# Patient Record
Sex: FEMALE | Race: WHITE | NOT HISPANIC OR LATINO | Employment: FULL TIME | URBAN - METROPOLITAN AREA
[De-identification: names, ages, dates, MRNs, and addresses within clinical notes are randomized per-mention and may not be internally consistent; named-entity substitution may affect disease eponyms.]

---

## 2024-08-11 ENCOUNTER — HOSPITAL ENCOUNTER (EMERGENCY)
Facility: HOSPITAL | Age: 39
Discharge: OTHER INSTITUTION | End: 2024-08-11
Attending: EMERGENCY MEDICINE
Payer: MEDICAID

## 2024-08-11 ENCOUNTER — APPOINTMENT (OUTPATIENT)
Dept: RADIOLOGY | Facility: HOSPITAL | Age: 39
End: 2024-08-11
Payer: MEDICAID

## 2024-08-11 VITALS
BODY MASS INDEX: 22.48 KG/M2 | OXYGEN SATURATION: 100 % | WEIGHT: 134.92 LBS | SYSTOLIC BLOOD PRESSURE: 133 MMHG | DIASTOLIC BLOOD PRESSURE: 74 MMHG | TEMPERATURE: 97.5 F | HEART RATE: 70 BPM | RESPIRATION RATE: 18 BRPM | HEIGHT: 65 IN

## 2024-08-11 DIAGNOSIS — S61.209A DEGLOVING INJURY OF FINGER, INITIAL ENCOUNTER: Primary | ICD-10-CM

## 2024-08-11 PROCEDURE — 99284 EMERGENCY DEPT VISIT MOD MDM: CPT | Mod: 25

## 2024-08-11 PROCEDURE — 96365 THER/PROPH/DIAG IV INF INIT: CPT

## 2024-08-11 PROCEDURE — 2500000004 HC RX 250 GENERAL PHARMACY W/ HCPCS (ALT 636 FOR OP/ED): Mod: JZ | Performed by: EMERGENCY MEDICINE

## 2024-08-11 PROCEDURE — 73140 X-RAY EXAM OF FINGER(S): CPT | Mod: RIGHT SIDE | Performed by: STUDENT IN AN ORGANIZED HEALTH CARE EDUCATION/TRAINING PROGRAM

## 2024-08-11 PROCEDURE — 99285 EMERGENCY DEPT VISIT HI MDM: CPT | Mod: 25

## 2024-08-11 PROCEDURE — 99291 CRITICAL CARE FIRST HOUR: CPT | Performed by: EMERGENCY MEDICINE

## 2024-08-11 PROCEDURE — 73140 X-RAY EXAM OF FINGER(S): CPT | Mod: RT

## 2024-08-11 RX ORDER — CEFAZOLIN SODIUM 1 G/50ML
1 SOLUTION INTRAVENOUS ONCE
Status: COMPLETED | OUTPATIENT
Start: 2024-08-11 | End: 2024-08-11

## 2024-08-11 ASSESSMENT — COLUMBIA-SUICIDE SEVERITY RATING SCALE - C-SSRS
6. HAVE YOU EVER DONE ANYTHING, STARTED TO DO ANYTHING, OR PREPARED TO DO ANYTHING TO END YOUR LIFE?: NO
1. IN THE PAST MONTH, HAVE YOU WISHED YOU WERE DEAD OR WISHED YOU COULD GO TO SLEEP AND NOT WAKE UP?: NO
2. HAVE YOU ACTUALLY HAD ANY THOUGHTS OF KILLING YOURSELF?: NO

## 2024-08-11 ASSESSMENT — PAIN DESCRIPTION - ORIENTATION: ORIENTATION: RIGHT

## 2024-08-11 ASSESSMENT — PAIN - FUNCTIONAL ASSESSMENT: PAIN_FUNCTIONAL_ASSESSMENT: 0-10

## 2024-08-11 ASSESSMENT — PAIN SCALES - GENERAL: PAINLEVEL_OUTOF10: 6

## 2024-08-11 ASSESSMENT — PAIN DESCRIPTION - LOCATION: LOCATION: FINGER (COMMENT WHICH ONE)

## 2024-08-12 NOTE — ED PROVIDER NOTES
HPI   Chief Complaint   Patient presents with    Skin avulsion d/t ring stuck     Pt was climbing out of a window when her ring got caught, ripping her skin and causing it to bleed.       Patient presents for injury to her right ring finger.  She states she was jumping out of a window and her finger got caught.  History and exam is limited secondary to patient speaks [place].  She states there is significant amount of bleeding.  She is brought in by friend who does much of the interpreting.  She has no other injuries.              Patient History   No past medical history on file.  No past surgical history on file.  No family history on file.  Social History     Tobacco Use    Smoking status: Not on file    Smokeless tobacco: Not on file   Substance Use Topics    Alcohol use: Not on file    Drug use: Not on file       Physical Exam   ED Triage Vitals [08/11/24 0236]   Temperature Heart Rate Respirations BP   36.4 °C (97.5 °F) 70 18 133/74      Pulse Ox Temp Source Heart Rate Source Patient Position   100 % Temporal Monitor Sitting      BP Location FiO2 (%)     Left arm --       Physical Exam  Constitutional:       General: She is in acute distress.   Pulmonary:      Effort: Pulmonary effort is normal.   Musculoskeletal:      Comments: Patient has a degloving injury secondary to her ring on her right fourth digit.  At proximal finger.  There is complete circumferential deep laceration.  No active bleeding.  Nursing staff remove the ring.  There is ability to extension at the MCP, PIP and DIP.  Good flexion at the DIP, PIP and MCP.  Tendons are visualized.  2-point discrimination is diminished.   Skin:     Comments: Skin distal to the degloving is dusky.   Neurological:      Mental Status: She is alert.           ED Course & MDM   Diagnoses as of 08/11/24 2222   Degloving injury of finger, initial encounter                 No data recorded                                 Medical Decision Making    Patient was in  significant pain.  To get a better evaluation examination I did do a proximal digital block with 1% lidocaine 5 mL.  Patient degloving injury of the right fourth digit.  Patient was given tetanus intramuscularly she was given a gram of Ancef.  X-ray shows a transverse fracture in of the middle phalanx.  There is also multiple radiopaque fragments within the wound whether it secondary to lead paint or metal fragments.  After my examination call was placed out immediately to  transfer center.  Discussed case with on-call hand surgery and for [facility].  He recommended transfer to [facility] as if this finger is viable it will need implantation team which is at [facility].  Discussed case with [facility] as well and patient was accepted there is at least a 2-hour wait for transport.  Discussed risk benefits with patient and her friend.  As this is of a time sensitive nature patient will be transferred by private vehicle.  Discussed the risks and benefits associated with this.  I believe it is in their best interest.  EMTALA form was filled out.  Patient was transferred to [facility] emergency department        Procedure  Critical Care    Performed by: Kendall Medina MD  Authorized by: Kendall Medina MD    Critical care provider statement:     Critical care time (minutes):  35    Critical care was necessary to treat or prevent imminent or life-threatening deterioration of the following conditions:  Trauma    Critical care was time spent personally by me on the following activities:  Ordering and performing treatments and interventions and review of old charts    Care discussed with: accepting provider at another facility         Kendall Medina MD  08/11/24 [id number]